# Patient Record
Sex: MALE | Race: WHITE | NOT HISPANIC OR LATINO | Employment: FULL TIME | RURAL
[De-identification: names, ages, dates, MRNs, and addresses within clinical notes are randomized per-mention and may not be internally consistent; named-entity substitution may affect disease eponyms.]

---

## 2020-12-21 ENCOUNTER — HISTORICAL (OUTPATIENT)
Dept: ADMINISTRATIVE | Facility: HOSPITAL | Age: 20
End: 2020-12-21

## 2020-12-21 LAB — SARS-COV+SARS-COV-2 AG RESP QL IA.RAPID: NEGATIVE

## 2023-12-22 ENCOUNTER — OFFICE VISIT (OUTPATIENT)
Dept: FAMILY MEDICINE | Facility: CLINIC | Age: 23
End: 2023-12-22

## 2023-12-22 VITALS
BODY MASS INDEX: 37.48 KG/M2 | SYSTOLIC BLOOD PRESSURE: 115 MMHG | RESPIRATION RATE: 18 BRPM | HEIGHT: 66 IN | TEMPERATURE: 98 F | WEIGHT: 233.19 LBS | DIASTOLIC BLOOD PRESSURE: 80 MMHG | HEART RATE: 92 BPM | OXYGEN SATURATION: 97 %

## 2023-12-22 DIAGNOSIS — J32.9 SINUSITIS, UNSPECIFIED CHRONICITY, UNSPECIFIED LOCATION: ICD-10-CM

## 2023-12-22 DIAGNOSIS — J02.9 SORE THROAT: ICD-10-CM

## 2023-12-22 DIAGNOSIS — H66.91 RIGHT OTITIS MEDIA, UNSPECIFIED OTITIS MEDIA TYPE: Primary | ICD-10-CM

## 2023-12-22 DIAGNOSIS — J34.89 SINUS DRAINAGE: ICD-10-CM

## 2023-12-22 DIAGNOSIS — H61.22 IMPACTED CERUMEN OF LEFT EAR: ICD-10-CM

## 2023-12-22 LAB
CTP QC/QA: YES
FLUAV AG NPH QL: NEGATIVE
FLUBV AG NPH QL: NEGATIVE
S PYO RRNA THROAT QL PROBE: NEGATIVE
SARS-COV-2 AG RESP QL IA.RAPID: NEGATIVE

## 2023-12-22 PROCEDURE — 87880 POCT RAPID STREP A: ICD-10-PCS | Mod: QW,,,

## 2023-12-22 PROCEDURE — 96372 THER/PROPH/DIAG INJ SC/IM: CPT | Mod: ,,,

## 2023-12-22 PROCEDURE — 87426 SARSCOV CORONAVIRUS AG IA: CPT | Mod: QW,,,

## 2023-12-22 PROCEDURE — 87804 INFLUENZA ASSAY W/OPTIC: CPT | Mod: 59,QW,,

## 2023-12-22 PROCEDURE — 87804 POCT INFLUENZA A/B: ICD-10-PCS | Mod: 59,QW,,

## 2023-12-22 PROCEDURE — 99204 PR OFFICE/OUTPT VISIT, NEW, LEVL IV, 45-59 MIN: ICD-10-PCS | Mod: 25,,,

## 2023-12-22 PROCEDURE — 87426 SARS CORONAVIRUS 2 ANTIGEN POCT: ICD-10-PCS | Mod: QW,,,

## 2023-12-22 PROCEDURE — 99204 OFFICE O/P NEW MOD 45 MIN: CPT | Mod: 25,,,

## 2023-12-22 PROCEDURE — 87880 STREP A ASSAY W/OPTIC: CPT | Mod: QW,,,

## 2023-12-22 PROCEDURE — 96372 PR INJECTION,THERAP/PROPH/DIAG2ST, IM OR SUBCUT: ICD-10-PCS | Mod: ,,,

## 2023-12-22 RX ORDER — DEXAMETHASONE SODIUM PHOSPHATE 4 MG/ML
4 INJECTION, SOLUTION INTRA-ARTICULAR; INTRALESIONAL; INTRAMUSCULAR; INTRAVENOUS; SOFT TISSUE
Status: COMPLETED | OUTPATIENT
Start: 2023-12-22 | End: 2023-12-22

## 2023-12-22 RX ORDER — CEFTRIAXONE 1 G/1
1 INJECTION, POWDER, FOR SOLUTION INTRAMUSCULAR; INTRAVENOUS
Status: COMPLETED | OUTPATIENT
Start: 2023-12-22 | End: 2023-12-22

## 2023-12-22 RX ORDER — AMOXICILLIN 875 MG/1
875 TABLET, FILM COATED ORAL EVERY 12 HOURS
Qty: 20 TABLET | Refills: 0 | Status: SHIPPED | OUTPATIENT
Start: 2023-12-22

## 2023-12-22 RX ORDER — CHLORPHENIRAMINE MALEATE AND PHENYLEPHRINE HYDROCHLORIDE 4; 10 MG/1; MG/1
1 TABLET, COATED ORAL EVERY 6 HOURS PRN
Qty: 10 TABLET | Refills: 0 | Status: SHIPPED | OUTPATIENT
Start: 2023-12-22 | End: 2024-01-01

## 2023-12-22 RX ADMIN — DEXAMETHASONE SODIUM PHOSPHATE 4 MG: 4 INJECTION, SOLUTION INTRA-ARTICULAR; INTRALESIONAL; INTRAMUSCULAR; INTRAVENOUS; SOFT TISSUE at 11:12

## 2023-12-22 RX ADMIN — CEFTRIAXONE 1 G: 1 INJECTION, POWDER, FOR SOLUTION INTRAMUSCULAR; INTRAVENOUS at 11:12

## 2023-12-22 NOTE — LETTER
December 22, 2023      [facility] [address] [place] [address]  Phone: [phone]  Fax: [phone]       Patient: Ozzy Ramires   YOB: 2000  Date of Visit: 12/22/2023    To Whom It May Concern:    Padmaja Ramires  was at [facility] on 12/22/2023. The patient may return to work/school on 12/23/2023 with no restrictions. If you have any questions or concerns, or if I can be of further assistance, please do not hesitate to contact me.    Sincerely,    Shanel Pace LPN

## 2023-12-22 NOTE — PROGRESS NOTES
HPI:   Ozzy Ramires is a pleasant 23 y.o. patient who reports to clinic with complaints of Head congestion, sinus drainage, and ears     Sore Throat   This is a new problem. The current episode started yesterday. The problem has been unchanged. Neither side of throat is experiencing more pain than the other. There has been no fever. The pain is at a severity of 0/10. The pain is mild. Associated symptoms include congestion. Pertinent negatives include no coughing, drooling, headaches or shortness of breath. He has tried nothing for the symptoms. The treatment provided no relief.   Sinus Problem  This is a new problem. The current episode started yesterday. The problem is unchanged. There has been no fever. Associated symptoms include congestion and a sore throat. Pertinent negatives include no chills, coughing, headaches or shortness of breath. Past treatments include nothing.                History reviewed. No pertinent past medical history.    PAST SURGICAL HISTORY:   History reviewed. No pertinent surgical history.    MEDICATIONS:    Current Outpatient Medications:     amoxicillin (AMOXIL) 875 MG tablet, Take 1 tablet (875 mg total) by mouth every 12 (twelve) hours., Disp: 20 tablet, Rfl: 0    chlorpheniramine-phenylephrine (ED A-HIST) 4-10 mg per tablet, Take 1 tablet by mouth every 6 (six) hours as needed for Congestion., Disp: 10 tablet, Rfl: 0    Current Facility-Administered Medications:     cefTRIAXone injection 1 g, 1 g, Intramuscular, 1 time in Clinic/HOD, Francie Munoz FNP    dexAMETHasone injection 4 mg, 4 mg, Intramuscular, 1 time in Clinic/HOD, Francie Munoz FNP    ALLERGIES:   Review of patient's allergies indicates:  No Known Allergies      Review of Systems   Constitutional: Negative.  Negative for activity change, chills and fever.   HENT:  Positive for nasal congestion and sore throat. Negative for drooling and nosebleeds.    Eyes: Negative.    Respiratory: Negative.  Negative for  cough, chest tightness and shortness of breath.    Cardiovascular: Negative.  Negative for chest pain and palpitations.   Gastrointestinal: Negative.    Endocrine: Negative.    Genitourinary: Negative.  Negative for difficulty urinating.   Musculoskeletal: Negative.    Integumentary:  Negative.   Allergic/Immunologic: Negative.    Neurological: Negative.  Negative for dizziness and headaches.   Hematological: Negative.    Psychiatric/Behavioral: Negative.     All other systems reviewed and are negative.         Physical Exam  Vitals reviewed.   Constitutional:       General: He is not in acute distress.     Appearance: Normal appearance. He is well-developed. He is obese. He is not ill-appearing.   HENT:      Head: Normocephalic and atraumatic.      Right Ear: Tympanic membrane is erythematous and bulging.      Left Ear: Tympanic membrane normal. There is impacted cerumen.      Nose:      Right Sinus: Maxillary sinus tenderness and frontal sinus tenderness present.      Left Sinus: Maxillary sinus tenderness and frontal sinus tenderness present.      Mouth/Throat:      Mouth: Mucous membranes are moist.      Pharynx: Oropharynx is clear. No posterior oropharyngeal erythema.   Cardiovascular:      Rate and Rhythm: Normal rate and regular rhythm.      Pulses: Normal pulses.      Heart sounds: Normal heart sounds.   Pulmonary:      Effort: Pulmonary effort is normal. No accessory muscle usage or respiratory distress.      Breath sounds: Normal breath sounds.   Abdominal:      General: Abdomen is flat. Bowel sounds are normal. There is no distension.      Palpations: Abdomen is soft.      Tenderness: There is no abdominal tenderness.   Musculoskeletal:         General: Normal range of motion.      Cervical back: Normal range of motion.   Skin:     General: Skin is warm and dry.      Capillary Refill: Capillary refill takes less than 2 seconds.   Neurological:      Mental Status: He is alert and oriented to person,  "place, and time. Mental status is at baseline.   Psychiatric:         Mood and Affect: Mood normal.         Speech: Speech normal.         Behavior: Behavior normal. Behavior is cooperative.         Thought Content: Thought content normal.          VITAL SIGNS:   /80 (BP Location: Right arm, Patient Position: Sitting, BP Method: Large (Automatic))   Pulse 92   Temp 98.4 °F (36.9 °C) (Oral)   Resp 18   Ht 5' 6" (1.676 m)   Wt 105.8 kg (233 lb 3.2 oz)   SpO2 97%   BMI 37.64 kg/m²       ASSESSMENT/PLAN  1. Sore throat  -     POCT rapid strep A    2. Sinus drainage  -     POCT Influenza A/B Rapid Antigen  -     SARS Coronavirus 2 Antigen, POCT    3. Impacted cerumen of left ear  Assessment & Plan:  Ears were cleaned out.   Able to visualize the TM good    Orders:  -     Ear wax removal    4. Right otitis media, unspecified otitis media type  Assessment & Plan:  Amoxicillin 875 mg bid for 10 days  Follow up if needed      Orders:  -     amoxicillin (AMOXIL) 875 MG tablet; Take 1 tablet (875 mg total) by mouth every 12 (twelve) hours.  Dispense: 20 tablet; Refill: 0    5. Sinusitis, unspecified chronicity, unspecified location  Assessment & Plan:  Rocephin 1GM IM  Decadron 4mg IM  Z-pack complete until the antibiotics are all gone even if you start to feel better.   Use Carmel-pot at home.  Increase fluid intake.   Ed a hist for congestion  Follow up with clinic if needed     Orders:  -     cefTRIAXone injection 1 g  -     dexAMETHasone injection 4 mg  -     chlorpheniramine-phenylephrine (ED A-HIST) 4-10 mg per tablet; Take 1 tablet by mouth every 6 (six) hours as needed for Congestion.  Dispense: 10 tablet; Refill: 0             Patient Instructions   Rocephin 1GM IM  Decadron 4mg IM  Complete Amoxicillin for 10 days until pills are gone, even if you start to feel better complete antibiotics.   Increase fluid intake.   Ed a hist as needed for congestion  Follow up with clinic as needed  Use Nelipot as " needed    Orders Placed This Encounter   Procedures    Ear wax removal    POCT Influenza A/B Rapid Antigen    SARS Coronavirus 2 Antigen, POCT    POCT rapid strep A

## 2023-12-22 NOTE — ASSESSMENT & PLAN NOTE
Rocephin 1GM IM  Decadron 4mg IM  Z-pack complete until the antibiotics are all gone even if you start to feel better.   Use Carmel-pot at home.  Increase fluid intake.   Ed a hist for congestion  Follow up with clinic if needed

## 2024-08-26 ENCOUNTER — OFFICE VISIT (OUTPATIENT)
Dept: FAMILY MEDICINE | Facility: CLINIC | Age: 24
End: 2024-08-26
Payer: COMMERCIAL

## 2024-08-26 VITALS
HEIGHT: 66 IN | RESPIRATION RATE: 18 BRPM | OXYGEN SATURATION: 97 % | TEMPERATURE: 99 F | DIASTOLIC BLOOD PRESSURE: 85 MMHG | BODY MASS INDEX: 38.12 KG/M2 | HEART RATE: 100 BPM | SYSTOLIC BLOOD PRESSURE: 126 MMHG | WEIGHT: 237.19 LBS

## 2024-08-26 DIAGNOSIS — H60.501 ACUTE OTITIS EXTERNA OF RIGHT EAR, UNSPECIFIED TYPE: Primary | ICD-10-CM

## 2024-08-26 DIAGNOSIS — H92.03 OTALGIA, BILATERAL: ICD-10-CM

## 2024-08-26 DIAGNOSIS — J03.90 TONSILLITIS: ICD-10-CM

## 2024-08-26 PROCEDURE — 99213 OFFICE O/P EST LOW 20 MIN: CPT | Mod: ,,, | Performed by: NURSE PRACTITIONER

## 2024-08-26 RX ORDER — NEOMYCIN SULFATE, POLYMYXIN B SULFATE AND HYDROCORTISONE 10; 3.5; 1 MG/ML; MG/ML; [USP'U]/ML
3 SUSPENSION/ DROPS AURICULAR (OTIC) 3 TIMES DAILY
Qty: 10 ML | Refills: 0 | Status: SHIPPED | OUTPATIENT
Start: 2024-08-26

## 2024-08-26 RX ORDER — CIPROFLOXACIN 250 MG/1
250 TABLET, FILM COATED ORAL EVERY 12 HOURS
Qty: 14 TABLET | Refills: 0 | Status: SHIPPED | OUTPATIENT
Start: 2024-08-26 | End: 2024-09-02

## 2024-08-26 NOTE — PROGRESS NOTES
"             [facility]    Urszula Woo, AGPCNP-BC  [facility]  [address] [place] [address]  [phone]          PATIENT NAME: Ozzy Ramires  : 2000  DATE: 24  MRN: [id number]          Reason for Visit        Chief Complaint   Patient presents with    Otalgia     Pt states his left ear is swollen and in pain. He says that this is an ongoing problem for him. His is not sure if it is a ear infection or just full of wax.        History of Present Illness      Ozzy Ramires is a 24 y.o. male with no significant chronic conditions who presents today for left ear pain and swelling. He reports this is an ongoing problems for his. He is not sure if it is just full or wax or an ear infection.     Otalgia   Associated symptoms include ear discharge, hearing loss ("hearing is muffled") and a sore throat ("I had a sore throat a few weeks ago"). Pertinent negatives include no abdominal pain, coughing, diarrhea, headaches or rhinorrhea.          MEDICAL / SURGICAL / SOCIAL HISTORY     No past medical history on file.    No past surgical history on file.    Social History     Tobacco Use    Smoking status: Every Day     Types: Cigarettes, Vaping with nicotine    Smokeless tobacco: Never   Substance Use Topics    Alcohol use: Not Currently    Drug use: Never         I personally reviewed all past medical, surgical, and social.     Review of Systems   Constitutional:  Negative for chills and fever.   HENT:  Positive for congestion ("a little bit"), ear discharge, ear pain (left), hearing loss ("hearing is muffled"), postnasal drip ("I clear my throat a lot"), sore throat ("I had a sore throat a few weeks ago") and tinnitus ("it comes and goes"). Negative for rhinorrhea.    Respiratory:  Negative for cough and shortness of breath.    Cardiovascular:  Negative for chest pain.   Gastrointestinal:  Negative for abdominal pain, constipation and " "diarrhea.   Genitourinary:  Negative for dysuria, frequency and urgency.   Musculoskeletal:  Negative for arthralgias and myalgias.   Neurological:  Negative for dizziness and headaches.   Psychiatric/Behavioral:  Negative for dysphoric mood and sleep disturbance. The patient is not nervous/anxious.         MEDICATIONS / ALLERGIES / HM     Current Outpatient Medications   Medication Sig Dispense Refill    ciprofloxacin HCl (CIPRO) 250 MG tablet Take 1 tablet (250 mg total) by mouth every 12 (twelve) hours. for 7 days 14 tablet 0    neomycin-polymyxin-hydrocortisone (CORTISPORIN) 3.5-10,000-1 mg/mL-unit/mL-% otic suspension Place 3 drops into the left ear 3 (three) times daily. 10 mL 0     No current facility-administered medications for this visit.       Review of patient's allergies indicates:  No Known Allergies      There is no immunization history on file for this patient.     Health Maintenance   Topic Date Due    Hepatitis C Screening  Never done    Lipid Panel  Never done    HPV Vaccines (1 - Male 3-dose series) Never done    TETANUS VACCINE  08/02/2022        Physical Exam      Vital Signs  Temp: 98.6 °F (37 °C)  Temp Source: Oral  Pulse: 100  Resp: 18  SpO2: 97 %  BP: 126/85  BP Location: Left arm  Patient Position: Sitting  Pain Score:   6  Pain Loc: Ear  Height and Weight  Height: 5' 6" (167.6 cm)  Weight: 107.6 kg (237 lb 3.2 oz)  BSA (Calculated - sq m): 2.24 sq meters  BMI (Calculated): 38.3  Weight in (lb) to have BMI = 25: 154.6]    Physical Exam  Constitutional:       General: He is not in acute distress.     Appearance: He is obese. He is ill-appearing.   HENT:      Head: Normocephalic.      Right Ear: Tympanic membrane and external ear normal. Tenderness present. No swelling. There is no impacted cerumen.      Left Ear: External ear normal. Decreased hearing noted. Swelling (erythema) and tenderness present.      Mouth/Throat:      Lips: Pink.      Mouth: Mucous membranes are moist.      Pharynx: " "Posterior oropharyngeal erythema present.      Tonsils: 2+ on the right. 2+ on the left.   Cardiovascular:      Rate and Rhythm: Normal rate and regular rhythm.      Pulses: Normal pulses.      Heart sounds: Normal heart sounds.   Pulmonary:      Effort: Pulmonary effort is normal.      Breath sounds: Normal breath sounds.   Abdominal:      Palpations: Abdomen is soft.      Tenderness: There is no abdominal tenderness.   Skin:     General: Skin is warm and dry.   Neurological:      Mental Status: He is alert and oriented to person, place, and time.   Psychiatric:         Mood and Affect: Mood normal.         Behavior: Behavior normal.          Laboratory:    No results found for: "GLU", "NA", "K", "CL", "CO2", "BUN", "CREATININE", "CALCIUM", "PROT", "ALBUMIN", "BILITOT", "ALKPHOS", "AST", "ALT", "ANIONGAP", "ESTGFRAFRICA", "EGFRNONAA"    No results found for: "WBC", "RBC", "HGB", "HCT", "MCV", "RDW", "PLT"     No results found for: "CHOL", "TRIG", "HDL", "LDLCALC", "TOTALCHOLEST"    No results found for: "TSH"    No results found for: "HGBA1C", "ESTIMATEDAVG"     No results found for: "QXGTAPCU03"    No results found for: "[id number]"    No results found for: "PSA"      Point Of Care Testing:    No results found for: "WBCUR", "NITRITE", "UROBILINOGEN", "PROTEINPOC", "PHUR", "BLOODUR", "SPECGRAV", "KETONESU", "BILIRUBINPOC", "GLUCOSEUR"    No results found for: "[id number]", "FLUAMOLEC", "FLUBMOLEC", "MOLSTREPAPOC"          Assessment/Plan     Acute otitis externa of right ear, unspecified type  -     Ambulatory referral/consult to ENT; 09/04/2024 @ 1020 with Dr. Garza  -     ciprofloxacin HCl (CIPRO) 250 MG tablet; Take 1 tablet (250 mg total) by mouth every 12 (twelve) hours. for 7 days  Dispense: 14 tablet; Refill: 0  -     neomycin-polymyxin-hydrocortisone (CORTISPORIN) 3.5-10,000-1 mg/mL-unit/mL-% otic suspension; Place 3 drops in the left ear 3 x daily.  Disp: 10 mL; Rf: 0    Tonsillitis  -     Ambulatory " referral/consult to ENT; 09/04/2024 @ 1020 with Dr. Garza    [facility]  -     Ambulatory referral/consult to ENT; 09/04/2024 @ 1020 with Dr. Garza        Future Appointments   Date Time Provider Department Center   9/4/2024 10:20 AM Garland Garza MD [facility]   9/26/2024  8:20 AM Francie Munoz FNP [facility]       Workup results were reviewed and all questions were answered. Diagnosis and treatment options were discussed and the patient  is amenable with the overall treatment plan. Verbal and written discharge instructions were given including to return to clinic/ED with any acute worsening of symptoms or failure of symptoms to improve. The reasons for return to the clinic/ED were explained in lay terms. No further intervention is warranted at this time. The patient agrees with the plan, expresses understanding, is hemodynamically stable and in no acute distress.     All questions answered to desired level of satisfaction          SANDOVAL Vences-BC  [facility]

## 2024-08-26 NOTE — PATIENT INSTRUCTIONS
Take your drugs as ordered by your doctor.  Sit or lie down with your head to the side and the ear that needs the ear drops pointing up.  Pull on your ear to straighten the ear canal.  Gently pull the ear up and toward the back of the head to straighten it. If you are giving the ear drops to a child under 3 years of age, gently pull the earlobe down and toward the back of the head.  Put the correct number of drops in your ear.  Gently press the small skin flap over the ear to help the drops run into the ear.  Continue to sit or lie with your head to the side for 5 minutes.  Your doctor may want you to put a small cotton plug in your ear to help keep the drops in place.  Keep the inside of your ear dry while it heals. You can protect your ear when you shower with a petroleum jelly-coated cotton ball. Avoid swimming for 7 to 10 days.  Do not use in-ear head phones (ear buds) or hearing aids while your ear heals.  Heat may help ease your ear pain.

## 2024-09-04 ENCOUNTER — OFFICE VISIT (OUTPATIENT)
Dept: OTOLARYNGOLOGY | Facility: CLINIC | Age: 24
End: 2024-09-04

## 2024-09-04 VITALS — WEIGHT: 235 LBS | HEIGHT: 66 IN | BODY MASS INDEX: 37.77 KG/M2

## 2024-09-04 DIAGNOSIS — J03.90 TONSILLITIS: ICD-10-CM

## 2024-09-04 DIAGNOSIS — H60.501 ACUTE OTITIS EXTERNA OF RIGHT EAR, UNSPECIFIED TYPE: ICD-10-CM

## 2024-09-04 DIAGNOSIS — H65.23 BILATERAL CHRONIC SEROUS OTITIS MEDIA: Primary | ICD-10-CM

## 2024-09-04 DIAGNOSIS — H92.03 OTALGIA, BILATERAL: ICD-10-CM

## 2024-09-04 PROCEDURE — 99999 PR PBB SHADOW E&M-EST. PATIENT-LVL IV: CPT | Mod: PBBFAC,,, | Performed by: OTOLARYNGOLOGY

## 2024-09-04 PROCEDURE — 99204 OFFICE O/P NEW MOD 45 MIN: CPT | Mod: S$PBB,,, | Performed by: OTOLARYNGOLOGY

## 2024-09-04 PROCEDURE — 99214 OFFICE O/P EST MOD 30 MIN: CPT | Mod: PBBFAC | Performed by: OTOLARYNGOLOGY

## 2024-09-04 NOTE — PROGRESS NOTES
Subjective:       Patient ID: Ozzy Ramires is a 24 y.o. male.    Chief Complaint: Otitis Externa (Patient referred for acute otitis externa of right ear.), Sore Throat (Patient referred for tonsillitis.), and Otalgia (Patient referred for pain in his ears.)    Sore Throat   Associated symptoms include ear pain.   Otalgia   Associated symptoms include a sore throat.     Review of Systems   HENT:  Positive for ear pain and sore throat.    All other systems reviewed and are negative.      Objective:      Physical Exam  General: NAD  Head: Normocephalic, atraumatic, no facial asymmetry/normal strength,  Ears: Both auricules normal in appearance, w/o deformities tympanic membranes fluid  external auditory canals normal  Nose: External nose w/o deformities normal turbinates no drainage or inflammation  Oral Cavity: Lips, gums, floor of mouth, tongue hard palate, and buccal mucosa without mass/lesion  Oropharynx: Mucosa pink and moist, soft palate, posterior pharynx and oropharyngeal wall without mass/lesion  Neck: Supple, symmetric, trachea midline, no palpable mass/lesion, no palpable cervical lymphadenopathy  Skin: Warm and dry, no concerning lesions  Respiratory: Respirations even, unlabored  Assessment:       1. Bilateral chronic serous otitis media    2. Acute otitis externa of right ear, unspecified type    3. Tonsillitis    4. Otalgia, bilateral        Plan:       Bilateral Tubes in OR recurrent infections for over 1 year especially left